# Patient Record
Sex: FEMALE | Race: WHITE | NOT HISPANIC OR LATINO | Employment: STUDENT | URBAN - METROPOLITAN AREA
[De-identification: names, ages, dates, MRNs, and addresses within clinical notes are randomized per-mention and may not be internally consistent; named-entity substitution may affect disease eponyms.]

---

## 2023-04-25 ENCOUNTER — HOSPITAL ENCOUNTER (OUTPATIENT)
Facility: CLINIC | Age: 23
Setting detail: OBSERVATION
Discharge: HOME | End: 2023-04-26
Attending: EMERGENCY MEDICINE | Admitting: SURGERY
Payer: OTHER GOVERNMENT

## 2023-04-25 ENCOUNTER — APPOINTMENT (OUTPATIENT)
Dept: ULTRASOUND IMAGING | Facility: CLINIC | Age: 23
End: 2023-04-25
Attending: EMERGENCY MEDICINE
Payer: OTHER GOVERNMENT

## 2023-04-25 ENCOUNTER — APPOINTMENT (OUTPATIENT)
Dept: CT IMAGING | Facility: CLINIC | Age: 23
End: 2023-04-25
Attending: EMERGENCY MEDICINE
Payer: OTHER GOVERNMENT

## 2023-04-25 ENCOUNTER — HOSPITAL ENCOUNTER (INPATIENT)
Facility: CLINIC | Age: 23
Setting detail: SURGERY ADMIT
End: 2023-04-25
Attending: SURGERY | Admitting: SURGERY
Payer: OTHER GOVERNMENT

## 2023-04-25 DIAGNOSIS — K35.30 ACUTE APPENDICITIS WITH LOCALIZED PERITONITIS, WITHOUT PERFORATION, ABSCESS, OR GANGRENE: Primary | ICD-10-CM

## 2023-04-25 LAB
ALBUMIN SERPL BCG-MCNC: 4.8 G/DL (ref 3.5–5.2)
ALBUMIN UR-MCNC: 200 MG/DL
ALP SERPL-CCNC: 128 U/L (ref 35–104)
ALT SERPL W P-5'-P-CCNC: 25 U/L (ref 10–35)
ANION GAP SERPL CALCULATED.3IONS-SCNC: 15 MMOL/L (ref 7–15)
APPEARANCE UR: ABNORMAL
AST SERPL W P-5'-P-CCNC: 36 U/L (ref 10–35)
BASOPHILS # BLD AUTO: 0 10E3/UL (ref 0–0.2)
BASOPHILS NFR BLD AUTO: 0 %
BILIRUB SERPL-MCNC: 1.5 MG/DL
BILIRUB UR QL STRIP: NEGATIVE
BUN SERPL-MCNC: 10.7 MG/DL (ref 6–20)
CALCIUM SERPL-MCNC: 10.1 MG/DL (ref 8.6–10)
CHLORIDE SERPL-SCNC: 101 MMOL/L (ref 98–107)
COLOR UR AUTO: YELLOW
CREAT SERPL-MCNC: 0.77 MG/DL (ref 0.51–0.95)
DEPRECATED HCO3 PLAS-SCNC: 20 MMOL/L (ref 22–29)
EOSINOPHIL # BLD AUTO: 0 10E3/UL (ref 0–0.7)
EOSINOPHIL NFR BLD AUTO: 0 %
ERYTHROCYTE [DISTWIDTH] IN BLOOD BY AUTOMATED COUNT: 14 % (ref 10–15)
GFR SERPL CREATININE-BSD FRML MDRD: >90 ML/MIN/1.73M2
GLUCOSE SERPL-MCNC: 108 MG/DL (ref 70–99)
GLUCOSE UR STRIP-MCNC: NEGATIVE MG/DL
HCG SERPL QL: NEGATIVE
HCT VFR BLD AUTO: 41.6 % (ref 35–47)
HGB BLD-MCNC: 13.5 G/DL (ref 11.7–15.7)
HGB UR QL STRIP: NEGATIVE
HOLD SPECIMEN: NORMAL
IMM GRANULOCYTES # BLD: 0.1 10E3/UL
IMM GRANULOCYTES NFR BLD: 1 %
KETONES UR STRIP-MCNC: 100 MG/DL
LEUKOCYTE ESTERASE UR QL STRIP: NEGATIVE
LIPASE SERPL-CCNC: 26 U/L (ref 13–60)
LYMPHOCYTES # BLD AUTO: 1 10E3/UL (ref 0.8–5.3)
LYMPHOCYTES NFR BLD AUTO: 7 %
MCH RBC QN AUTO: 29 PG (ref 26.5–33)
MCHC RBC AUTO-ENTMCNC: 32.5 G/DL (ref 31.5–36.5)
MCV RBC AUTO: 90 FL (ref 78–100)
MONOCYTES # BLD AUTO: 0.3 10E3/UL (ref 0–1.3)
MONOCYTES NFR BLD AUTO: 2 %
MUCOUS THREADS #/AREA URNS LPF: PRESENT /LPF
NEUTROPHILS # BLD AUTO: 14.1 10E3/UL (ref 1.6–8.3)
NEUTROPHILS NFR BLD AUTO: 90 %
NITRATE UR QL: NEGATIVE
NRBC # BLD AUTO: 0 10E3/UL
NRBC BLD AUTO-RTO: 0 /100
PH UR STRIP: 8.5 [PH] (ref 5–7)
PLATELET # BLD AUTO: 450 10E3/UL (ref 150–450)
POTASSIUM SERPL-SCNC: 4 MMOL/L (ref 3.4–5.3)
PROT SERPL-MCNC: 8.6 G/DL (ref 6.4–8.3)
RADIOLOGIST FLAGS: ABNORMAL
RBC # BLD AUTO: 4.65 10E6/UL (ref 3.8–5.2)
RBC URINE: 1 /HPF
SODIUM SERPL-SCNC: 136 MMOL/L (ref 136–145)
SP GR UR STRIP: 1.03 (ref 1–1.03)
SQUAMOUS EPITHELIAL: 6 /HPF
UROBILINOGEN UR STRIP-MCNC: NORMAL MG/DL
WBC # BLD AUTO: 15.6 10E3/UL (ref 4–11)
WBC URINE: 1 /HPF

## 2023-04-25 PROCEDURE — 96365 THER/PROPH/DIAG IV INF INIT: CPT | Mod: 59 | Performed by: EMERGENCY MEDICINE

## 2023-04-25 PROCEDURE — 36415 COLL VENOUS BLD VENIPUNCTURE: CPT | Performed by: EMERGENCY MEDICINE

## 2023-04-25 PROCEDURE — 99285 EMERGENCY DEPT VISIT HI MDM: CPT | Performed by: EMERGENCY MEDICINE

## 2023-04-25 PROCEDURE — 96375 TX/PRO/DX INJ NEW DRUG ADDON: CPT | Performed by: EMERGENCY MEDICINE

## 2023-04-25 PROCEDURE — 76705 ECHO EXAM OF ABDOMEN: CPT | Mod: 26 | Performed by: RADIOLOGY

## 2023-04-25 PROCEDURE — 258N000003 HC RX IP 258 OP 636

## 2023-04-25 PROCEDURE — 84703 CHORIONIC GONADOTROPIN ASSAY: CPT | Performed by: EMERGENCY MEDICINE

## 2023-04-25 PROCEDURE — 250N000011 HC RX IP 250 OP 636

## 2023-04-25 PROCEDURE — 96376 TX/PRO/DX INJ SAME DRUG ADON: CPT | Performed by: EMERGENCY MEDICINE

## 2023-04-25 PROCEDURE — 83690 ASSAY OF LIPASE: CPT | Performed by: EMERGENCY MEDICINE

## 2023-04-25 PROCEDURE — 250N000013 HC RX MED GY IP 250 OP 250 PS 637: Performed by: EMERGENCY MEDICINE

## 2023-04-25 PROCEDURE — 96376 TX/PRO/DX INJ SAME DRUG ADON: CPT

## 2023-04-25 PROCEDURE — 81001 URINALYSIS AUTO W/SCOPE: CPT | Performed by: EMERGENCY MEDICINE

## 2023-04-25 PROCEDURE — 250N000011 HC RX IP 250 OP 636: Performed by: EMERGENCY MEDICINE

## 2023-04-25 PROCEDURE — 99285 EMERGENCY DEPT VISIT HI MDM: CPT | Mod: 25 | Performed by: EMERGENCY MEDICINE

## 2023-04-25 PROCEDURE — 74177 CT ABD & PELVIS W/CONTRAST: CPT

## 2023-04-25 PROCEDURE — 76705 ECHO EXAM OF ABDOMEN: CPT

## 2023-04-25 PROCEDURE — 85025 COMPLETE CBC W/AUTO DIFF WBC: CPT | Performed by: EMERGENCY MEDICINE

## 2023-04-25 PROCEDURE — 74177 CT ABD & PELVIS W/CONTRAST: CPT | Mod: 26 | Performed by: STUDENT IN AN ORGANIZED HEALTH CARE EDUCATION/TRAINING PROGRAM

## 2023-04-25 PROCEDURE — 82040 ASSAY OF SERUM ALBUMIN: CPT | Performed by: EMERGENCY MEDICINE

## 2023-04-25 PROCEDURE — G0378 HOSPITAL OBSERVATION PER HR: HCPCS

## 2023-04-25 PROCEDURE — 96361 HYDRATE IV INFUSION ADD-ON: CPT | Performed by: EMERGENCY MEDICINE

## 2023-04-25 PROCEDURE — 258N000003 HC RX IP 258 OP 636: Performed by: EMERGENCY MEDICINE

## 2023-04-25 RX ORDER — HYDROMORPHONE HYDROCHLORIDE 1 MG/ML
0.5 INJECTION, SOLUTION INTRAMUSCULAR; INTRAVENOUS; SUBCUTANEOUS
Status: COMPLETED | OUTPATIENT
Start: 2023-04-25 | End: 2023-04-25

## 2023-04-25 RX ORDER — PROCHLORPERAZINE MALEATE 5 MG
5 TABLET ORAL EVERY 6 HOURS PRN
Status: DISCONTINUED | OUTPATIENT
Start: 2023-04-25 | End: 2023-04-26 | Stop reason: HOSPADM

## 2023-04-25 RX ORDER — POLYETHYLENE GLYCOL 3350 17 G/17G
17 POWDER, FOR SOLUTION ORAL DAILY PRN
Status: DISCONTINUED | OUTPATIENT
Start: 2023-04-25 | End: 2023-04-26 | Stop reason: HOSPADM

## 2023-04-25 RX ORDER — ALBUTEROL SULFATE 90 UG/1
2 AEROSOL, METERED RESPIRATORY (INHALATION) ONCE
Status: COMPLETED | OUTPATIENT
Start: 2023-04-25 | End: 2023-04-25

## 2023-04-25 RX ORDER — PROCHLORPERAZINE 25 MG
25 SUPPOSITORY, RECTAL RECTAL EVERY 12 HOURS PRN
Status: DISCONTINUED | OUTPATIENT
Start: 2023-04-25 | End: 2023-04-26 | Stop reason: HOSPADM

## 2023-04-25 RX ORDER — SODIUM CHLORIDE, SODIUM LACTATE, POTASSIUM CHLORIDE, CALCIUM CHLORIDE 600; 310; 30; 20 MG/100ML; MG/100ML; MG/100ML; MG/100ML
INJECTION, SOLUTION INTRAVENOUS CONTINUOUS
Status: DISCONTINUED | OUTPATIENT
Start: 2023-04-25 | End: 2023-04-26 | Stop reason: HOSPADM

## 2023-04-25 RX ORDER — IOPAMIDOL 755 MG/ML
107 INJECTION, SOLUTION INTRAVASCULAR ONCE
Status: COMPLETED | OUTPATIENT
Start: 2023-04-25 | End: 2023-04-25

## 2023-04-25 RX ORDER — HYDROMORPHONE HCL IN WATER/PF 6 MG/30 ML
0.2 PATIENT CONTROLLED ANALGESIA SYRINGE INTRAVENOUS ONCE
Status: COMPLETED | OUTPATIENT
Start: 2023-04-25 | End: 2023-04-25

## 2023-04-25 RX ORDER — OXYCODONE HYDROCHLORIDE 5 MG/1
5-10 TABLET ORAL EVERY 4 HOURS PRN
Status: DISCONTINUED | OUTPATIENT
Start: 2023-04-25 | End: 2023-04-26 | Stop reason: HOSPADM

## 2023-04-25 RX ORDER — AMOXICILLIN 250 MG
2 CAPSULE ORAL 2 TIMES DAILY PRN
Status: DISCONTINUED | OUTPATIENT
Start: 2023-04-25 | End: 2023-04-26 | Stop reason: HOSPADM

## 2023-04-25 RX ORDER — ACETAMINOPHEN 325 MG/1
975 TABLET ORAL EVERY 8 HOURS
Status: DISCONTINUED | OUTPATIENT
Start: 2023-04-25 | End: 2023-04-26 | Stop reason: HOSPADM

## 2023-04-25 RX ORDER — ERTAPENEM 1 G/1
1 INJECTION, POWDER, LYOPHILIZED, FOR SOLUTION INTRAMUSCULAR; INTRAVENOUS ONCE
Status: COMPLETED | OUTPATIENT
Start: 2023-04-25 | End: 2023-04-25

## 2023-04-25 RX ORDER — LORAZEPAM 0.5 MG/1
0.5 TABLET ORAL EVERY 6 HOURS PRN
COMMUNITY

## 2023-04-25 RX ORDER — TRAZODONE HYDROCHLORIDE 100 MG/1
100 TABLET ORAL AT BEDTIME
COMMUNITY

## 2023-04-25 RX ORDER — ONDANSETRON 4 MG/1
4 TABLET, ORALLY DISINTEGRATING ORAL EVERY 6 HOURS PRN
Status: DISCONTINUED | OUTPATIENT
Start: 2023-04-25 | End: 2023-04-26 | Stop reason: HOSPADM

## 2023-04-25 RX ORDER — ONDANSETRON 2 MG/ML
4 INJECTION INTRAMUSCULAR; INTRAVENOUS EVERY 6 HOURS PRN
Status: DISCONTINUED | OUTPATIENT
Start: 2023-04-25 | End: 2023-04-26 | Stop reason: HOSPADM

## 2023-04-25 RX ORDER — DEXTROAMPHETAMINE SACCHARATE, AMPHETAMINE ASPARTATE, DEXTROAMPHETAMINE SULFATE AND AMPHETAMINE SULFATE 1.25; 1.25; 1.25; 1.25 MG/1; MG/1; MG/1; MG/1
5 TABLET ORAL 3 TIMES DAILY
COMMUNITY

## 2023-04-25 RX ORDER — ERTAPENEM 1 G/1
1 INJECTION, POWDER, LYOPHILIZED, FOR SOLUTION INTRAMUSCULAR; INTRAVENOUS EVERY 24 HOURS
Status: DISCONTINUED | OUTPATIENT
Start: 2023-04-26 | End: 2023-04-26 | Stop reason: HOSPADM

## 2023-04-25 RX ORDER — ONDANSETRON 2 MG/ML
4 INJECTION INTRAMUSCULAR; INTRAVENOUS EVERY 30 MIN PRN
Status: COMPLETED | OUTPATIENT
Start: 2023-04-25 | End: 2023-04-25

## 2023-04-25 RX ORDER — AMOXICILLIN 250 MG
1 CAPSULE ORAL 2 TIMES DAILY PRN
Status: DISCONTINUED | OUTPATIENT
Start: 2023-04-25 | End: 2023-04-26 | Stop reason: HOSPADM

## 2023-04-25 RX ORDER — FLUOXETINE 20 MG/1
60 TABLET, FILM COATED ORAL DAILY
COMMUNITY

## 2023-04-25 RX ORDER — HYDROMORPHONE HCL IN WATER/PF 6 MG/30 ML
.2-.4 PATIENT CONTROLLED ANALGESIA SYRINGE INTRAVENOUS EVERY 4 HOURS PRN
Status: DISCONTINUED | OUTPATIENT
Start: 2023-04-25 | End: 2023-04-26 | Stop reason: HOSPADM

## 2023-04-25 RX ADMIN — HYDROMORPHONE HYDROCHLORIDE 0.2 MG: 0.2 INJECTION, SOLUTION INTRAMUSCULAR; INTRAVENOUS; SUBCUTANEOUS at 20:32

## 2023-04-25 RX ADMIN — HYDROMORPHONE HYDROCHLORIDE 0.2 MG: 0.2 INJECTION, SOLUTION INTRAMUSCULAR; INTRAVENOUS; SUBCUTANEOUS at 21:38

## 2023-04-25 RX ADMIN — ONDANSETRON 4 MG: 2 INJECTION INTRAMUSCULAR; INTRAVENOUS at 13:41

## 2023-04-25 RX ADMIN — SODIUM CHLORIDE, POTASSIUM CHLORIDE, SODIUM LACTATE AND CALCIUM CHLORIDE: 600; 310; 30; 20 INJECTION, SOLUTION INTRAVENOUS at 20:42

## 2023-04-25 RX ADMIN — ONDANSETRON 4 MG: 2 INJECTION INTRAMUSCULAR; INTRAVENOUS at 15:57

## 2023-04-25 RX ADMIN — ERTAPENEM SODIUM 1 G: 1 INJECTION, POWDER, LYOPHILIZED, FOR SOLUTION INTRAMUSCULAR; INTRAVENOUS at 18:28

## 2023-04-25 RX ADMIN — SODIUM CHLORIDE 1000 ML: 9 INJECTION, SOLUTION INTRAVENOUS at 13:40

## 2023-04-25 RX ADMIN — ALBUTEROL SULFATE 2 PUFF: 90 AEROSOL, METERED RESPIRATORY (INHALATION) at 18:28

## 2023-04-25 RX ADMIN — HYDROMORPHONE HYDROCHLORIDE 0.2 MG: 0.2 INJECTION, SOLUTION INTRAMUSCULAR; INTRAVENOUS; SUBCUTANEOUS at 21:33

## 2023-04-25 RX ADMIN — HYDROMORPHONE HYDROCHLORIDE 0.5 MG: 1 INJECTION, SOLUTION INTRAMUSCULAR; INTRAVENOUS; SUBCUTANEOUS at 18:28

## 2023-04-25 RX ADMIN — IOPAMIDOL 107 ML: 755 INJECTION, SOLUTION INTRAVENOUS at 17:12

## 2023-04-25 RX ADMIN — ONDANSETRON 4 MG: 2 INJECTION INTRAMUSCULAR; INTRAVENOUS at 18:27

## 2023-04-25 ASSESSMENT — ACTIVITIES OF DAILY LIVING (ADL)
ADLS_ACUITY_SCORE: 35

## 2023-04-25 NOTE — CONSULTS
General Surgery Consult  2023    Cristian Ashford  : 2000    Date of Service: 2023 6:28 PM    Assessment and Plan:  Cristian Ashford is a 22 year old female with no significant PMH who presents with one day of nausea, vomiting, and abdominal pain. On exam she is afebrile, hemodynamically stable, with tenderness to palpation in RLQ with guarding. Labs significant for leukocytosis of 15.6. UA negative. Her CT scan demonstrates a thickened appendix with periappendiceal streaking, as well as edematous corpus luteal cyst possibly hemorrhagic, with final read mentioning questionable hematoma/ongoing bleed. Initially suspected patient had appendicitis given vomiting, abd pain, and what was initially read as hemorrhagic cyst as patient knew she had this previously, however with final read having concern for bleeding, GYN team consulted.     - Admit to EGS obs  - Gyn consult given final read  - stat hgb recheck   - NPO   - IVF   - received 1 dose ertapenem in ED, will redose if needed tomorrow evening  - patient planned for lap appy tomorrow with Dr. Bajwa, will continue to plan on this for now.   -GYN consult appreciated - resident and staff looked at scan. If patient remains hemodynamically stable and hgb recheck okay, okay to wait for OR til tomorrow am with Dr. Bajwa, with call for intraop consult in am. GYN day team made aware of plan for intraop consult. If Hgb drops, will alert GYN of situation urgently.     Discussed with Dr. Brown, surgery chief resident, and Dr. Lozano, staff, who are in agreement with the above.     Poli Myers MD  Surgery PGY2    History of Present Illness:    Cristian Ashford is a 22 year old female with no significant PMH who presents with one day of nausea, vomiting, and abdominal pain. States it started this am, first kind of all over her abdomen, then localized to the  RLQ. Has vomited about 10 times today, remains nauseous. No appetite. LMP was 3 weeks  ago, No pain with urination. No fevers/chills.  She does not have any allergies.   She has never had surgery before, has had anesthesia with wisdom teeth removal without issue.   She does not smoke cigarettes  Smokes marijuana 3x/week  Does not take blood thinners.     Past Medical History:  History reviewed. No pertinent past medical history.    Past Surgical History  No past surgical history on file.    Family History:  No family history on file.    Social History:  Social History     Socioeconomic History     Marital status: Single     Spouse name: Not on file     Number of children: Not on file     Years of education: Not on file     Highest education level: Not on file   Occupational History     Not on file   Tobacco Use     Smoking status: Not on file     Smokeless tobacco: Not on file   Vaping Use     Vaping status: Not on file   Substance and Sexual Activity     Alcohol use: Not on file     Drug use: Not on file     Sexual activity: Not on file   Other Topics Concern     Not on file   Social History Narrative     Not on file     Social Determinants of Health     Financial Resource Strain: Not on file   Food Insecurity: Not on file   Transportation Needs: Not on file   Physical Activity: Not on file   Stress: Not on file   Social Connections: Not on file   Intimate Partner Violence: Not on file   Housing Stability: Not on file       Medications:  Current Outpatient Medications   Medication Sig Dispense Refill     amphetamine-dextroamphetamine (ADDERALL) 5 MG tablet Take 5 mg by mouth 3 times daily       FLUoxetine 20 MG tablet Take 60 mg by mouth daily       LORazepam (ATIVAN) 0.5 MG tablet Take 0.5 mg by mouth every 6 hours as needed for anxiety       traZODone (DESYREL) 100 MG tablet Take 100 mg by mouth At Bedtime         Allergies:   No Known Allergies    Review of Symptoms:  A 10 point review of symptoms has been conducted and is negative except for that mentioned in the above HPI.       Physical  Exam:    Blood pressure 122/79, pulse 71, temperature 98.2  F (36.8  C), temperature source Oral, weight 79.4 kg (175 lb), last menstrual period 04/04/2023, SpO2 100 %.  Gen:    Lying in bed in NAD, A&OX3  HEENT: Normocephalic and atraumatic  CV:  RRR  Pulm:  Non-labored breathing  Abd:  Soft,tender to palpation RLQ, non-distended  Ext:  Warm and well perfused, no obvious deformities    Labs:  CBC RESULTS:   Recent Labs   Lab Test 04/25/23  1338   WBC 15.6*   RBC 4.65   HGB 13.5   HCT 41.6   MCV 90   MCH 29.0   MCHC 32.5   RDW 14.0        Last Basic Metabolic Panel:  Lab Results   Component Value Date     04/25/2023      Lab Results   Component Value Date    POTASSIUM 4.0 04/25/2023     Lab Results   Component Value Date    CHLORIDE 101 04/25/2023     Lab Results   Component Value Date    THEODORE 10.1 04/25/2023     Lab Results   Component Value Date    CO2 20 04/25/2023     Lab Results   Component Value Date    BUN 10.7 04/25/2023     Lab Results   Component Value Date    CR 0.77 04/25/2023     Lab Results   Component Value Date     04/25/2023       Imaging:  CT Abdomen Pelvis w Contrast    Result Date: 4/25/2023  EXAMINATION: CT ABDOMEN PELVIS W CONTRAST  4/25/2023 5:20 PM  CLINICAL HISTORY: Nausea, vomiting, abdominal pain; concern for acute appendicitis COMPARISON: Abdominal ultrasound 4/25/2023 PROCEDURE COMMENTS: CT of the abdomen was performed with 77 mL of Isovue-370 intravenous contrast. Coronal and sagittal reformatted images were obtained. FINDINGS: Lower thorax: Normal. Abdomen and pelvis: The liver and biliary system, spleen, and pancreas are normal in appearance. The adrenal glands and kidneys are normal in appearance. There are no abnormally dilated or thickened loops of small bowel or colon. There is no free air. The appendix measures 11 mm in diameter (series 3, image 55 and coronal, image 38), with periappendiceal streakiness (series 6, image 38) no surrounding fluid collection.  Small appendicolith is noted within the lumen (series 6, image 38). The right ovary is enlarged measuring approximately 4 x 3.1 cm (series 3, image 66), corpus luteum visualized (series 6, image 14). Superior to the corpus luteal cyst, there is a 1.3 x 0.9 cm area of hypodensity/enhancement (series 3, image 63). Few prominent lymph nodes. Osseous structures: Normal.     IMPRESSION: 1. Thickened appendix with periappendiceal streakiness, concerning for acute appendicitis. No adjacent collection or intra-abdominal free air to suggest  perforation. 2. Edematous bulky right ovary with corpus luteal cyst with adjacent focal hyperdensity/enhancement and mild intermediate density free fluid in the cul-de-sac likely representing hemorrhagic corpus luteal cyst with associated hematoma/ongoing bleed and small hemoperitoneum. [Urgent Result: Appendicitis] Finding was identified on 4/25/2023 5:23 PM. Dr. Crespo was contacted by Dr. Galeano at 4/25/2023 5:43 PM and verbalized understanding of the urgent finding. Finding discussed with Dr. Crespo by Lokesh at 6:30 PM 4/25/2023 I have personally reviewed the examination and initial interpretation and I agree with the findings. CHRISTOPHER OROZCO MD   SYSTEM ID:  [id number]    US Abdomen Limited (RUQ)    Result Date: 4/25/2023  EXAM: Ultrasound abdomen limited 4/25/2023 3:24 PM HISTORY: Pain, vomiting, abnormal LFTs COMPARISON: None available TECHNIQUE: The abdomen was scanned in standard fashion with specialized ultrasound transducer(s) using both grey scale and limited color Doppler techniques. FINDINGS: FLUID: No ascites or pleural effusion. HEPATIC: The liver demonstrates normal echotexture, measuring 17.0 cm in craniocaudal dimension. No focal mass. Main portal vein is patent with antegrade flow towards the liver. GALLBLADDER: No wall thickening, pericholecystic fluid, positive sonographic Hurst's sign, or cholelithiasis. BILIARY DUCTS: Both the intra- and extrahepatic biliary  system are of normal caliber. The common bile duct measures 3.0 mm in diameter. PANCREAS: Visualized portions of the head and body of the pancreas are unremarkable. RENAL: The right kidney measures 10.9 cm in length. No hydronephrosis, calculus, lesion, or mass.     IMPRESSION: Mild hepatomegaly. Otherwise normal right upper quadrant ultrasound. No sonographic correlate to explain the patient's symptoms and LFT elevation. I have personally reviewed the examination and initial interpretation and I agree with the findings. KARI SOLOMON MD   SYSTEM ID:  [id number]

## 2023-04-25 NOTE — ED TRIAGE NOTES
Pt has been vomiting since last night. She thinks she may have eaten something bad. No diarrhea. She has abdominal pain generalized at the top. She says she feels like she is going to pass out. She took a pregnancy test this am that was negative.        right upper arm

## 2023-04-25 NOTE — ED PROVIDER NOTES
[facility] DEPARTMENT ([facility])    4/25/23       ED PROVIDER NOTE   Vertical Triage D   History     Chief Complaint   Patient presents with     Vomiting     The history is provided by the patient.     Cristian Ashford is a 22 year old female presenting to the emergency department with nonbloody, nonbilious vomiting and nausea since this morning.  Patient has no prior medical history other than mental health issues.  No prior abdominal surgeries.  She denies any fevers, diarrhea, however, she cannot remember the last time she had a bowel movement.  She does have some mild, upper, mid abdominal pain with vomiting only.  No cough.  No other concerns or complaints.  She denies possibility of pregnancy.      This part of the document was transcribed by Delilah Brenner, Medical Scribe.    Past Medical History  History reviewed. No pertinent past medical history.  No past surgical history on file.  amphetamine-dextroamphetamine (ADDERALL) 5 MG tablet  FLUoxetine 20 MG tablet  LORazepam (ATIVAN) 0.5 MG tablet  traZODone (DESYREL) 100 MG tablet      No Known Allergies  Family History  No family history on file.  Social History          A medically appropriate review of systems was performed with pertinent positives and negatives noted in the HPI, and all other systems negative.    Physical Exam   BP: 122/79  Pulse: 71  Temp: 98.2  F (36.8  C)  Weight: 79.4 kg (175 lb)  SpO2: 100 %  Physical Exam  Vitals and nursing note reviewed.   Constitutional:       General: She is not in acute distress.     Appearance: She is normal weight. She is not ill-appearing or toxic-appearing.   HENT:      Head: Normocephalic and atraumatic.      Mouth/Throat:      Mouth: Mucous membranes are moist.      Pharynx: Oropharynx is clear.   Eyes:      Extraocular Movements: Extraocular movements intact.      Conjunctiva/sclera: Conjunctivae normal.   Cardiovascular:      Rate and Rhythm: Normal rate.   Pulmonary:       Effort: Pulmonary effort is normal. No respiratory distress.      Breath sounds: No wheezing, rhonchi or rales.   Abdominal:      General: Abdomen is flat. Bowel sounds are normal. There is no distension.      Tenderness: There is abdominal tenderness in the epigastric area. There is no right CVA tenderness, left CVA tenderness, guarding or rebound.      Hernia: No hernia is present.   Musculoskeletal:         General: Normal range of motion.      Cervical back: Normal range of motion and neck supple.   Neurological:      General: No focal deficit present.      Mental Status: She is alert and oriented to person, place, and time.   Psychiatric:         Mood and Affect: Mood normal.         Behavior: Behavior normal.         Thought Content: Thought content normal.         Judgment: Judgment normal.           ED Course, Procedures, & Data      Procedures                     Results for orders placed or performed during the hospital encounter of 04/25/23   US Abdomen Limited (RUQ)     Status: None    Narrative    EXAM: Ultrasound abdomen limited 4/25/2023 3:24 PM     HISTORY: Pain, vomiting, abnormal LFTs     COMPARISON: None available    TECHNIQUE: The abdomen was scanned in standard fashion with  specialized ultrasound transducer(s) using both grey scale and limited  color Doppler techniques.    FINDINGS:     FLUID: No ascites or pleural effusion.    HEPATIC: The liver demonstrates normal echotexture, measuring 17.0 cm  in craniocaudal dimension. No focal mass. Main portal vein is patent  with antegrade flow towards the liver.    GALLBLADDER: No wall thickening, pericholecystic fluid, positive  sonographic Hurst's sign, or cholelithiasis.    BILIARY DUCTS: Both the intra- and extrahepatic biliary system are of  normal caliber. The common bile duct measures 3.0 mm in diameter.    PANCREAS: Visualized portions of the head and body of the pancreas are  unremarkable.    RENAL: The right kidney measures 10.9 cm in  length. No hydronephrosis,  calculus, lesion, or mass.        Impression    IMPRESSION:   Mild hepatomegaly. Otherwise normal right upper quadrant ultrasound.  No sonographic correlate to explain the patient's symptoms and LFT  elevation.      I have personally reviewed the examination and initial interpretation  and I agree with the findings.    KARI SOLOMON MD         SYSTEM ID:  [id number]   CT Abdomen Pelvis w Contrast     Status: Abnormal   Result Value Ref Range    Radiologist flags Appendicitis (Urgent)     Narrative    EXAMINATION: CT ABDOMEN PELVIS W CONTRAST  4/25/2023 5:20 PM      CLINICAL HISTORY: Nausea, vomiting, abdominal pain; concern for acute  appendicitis    COMPARISON: Abdominal ultrasound 4/25/2023    PROCEDURE COMMENTS: CT of the abdomen was performed with 77 mL of  Isovue-370 intravenous contrast. Coronal and sagittal reformatted  images were obtained.    FINDINGS:  Lower thorax:   Normal.    Abdomen and pelvis:  The liver and biliary system, spleen, and pancreas are normal in  appearance. The adrenal glands and kidneys are normal in appearance.    There are no abnormally dilated or thickened loops of small bowel or  colon. There is no free air.     The appendix measures 11 mm in diameter (series 3, image 55 and  coronal, image 38), with periappendiceal streakiness (series 6, image  38) no surrounding fluid collection. Small appendicolith is noted  within the lumen (series 6, image 38).    The right ovary is enlarged measuring approximately 4 x 3.1 cm (series  3, image 66), corpus luteum visualized (series 6, image 14). Superior  to the corpus luteal cyst, there is a 1.3 x 0.9 cm area of  hypodensity/enhancement (series 3, image 63).    Few prominent lymph nodes.    Osseous structures:   Normal.      Impression    IMPRESSION:    1. Thickened appendix with periappendiceal streakiness, concerning for  acute appendicitis. No adjacent collection or intra-abdominal free air  to suggest   perforation.  2. Edematous bulky right ovary with corpus luteal cyst with adjacent  focal hyperdensity/enhancement and mild intermediate density free  fluid in the cul-de-sac likely representing hemorrhagic corpus luteal  cyst with associated hematoma/ongoing bleed and small hemoperitoneum.    [Urgent Result: Appendicitis]    Finding was identified on 4/25/2023 5:23 PM.     Dr. Crespo was contacted by Dr. Galeano at 4/25/2023 5:43 PM and  verbalized understanding of the urgent finding. Finding discussed with  Dr. Crespo by Lokesh at 6:30 PM 4/25/2023     I have personally reviewed the examination and initial interpretation  and I agree with the findings.    CHRISTOPHER OROZCO MD         SYSTEM ID:  [id number]   Comprehensive metabolic panel     Status: Abnormal   Result Value Ref Range    Sodium 136 136 - 145 mmol/L    Potassium 4.0 3.4 - 5.3 mmol/L    Chloride 101 98 - 107 mmol/L    Carbon Dioxide (CO2) 20 (L) 22 - 29 mmol/L    Anion Gap 15 7 - 15 mmol/L    Urea Nitrogen 10.7 6.0 - 20.0 mg/dL    Creatinine 0.77 0.51 - 0.95 mg/dL    Calcium 10.1 (H) 8.6 - 10.0 mg/dL    Glucose 108 (H) 70 - 99 mg/dL    Alkaline Phosphatase 128 (H) 35 - 104 U/L    AST 36 (H) 10 - 35 U/L    ALT 25 10 - 35 U/L    Protein Total 8.6 (H) 6.4 - 8.3 g/dL    Albumin 4.8 3.5 - 5.2 g/dL    Bilirubin Total 1.5 (H) <=1.2 mg/dL    GFR Estimate >90 >60 mL/min/1.73m2   UA with Microscopic reflex to Culture     Status: Abnormal    Specimen: Urine, Midstream   Result Value Ref Range    Color Urine Yellow Colorless, Straw, Light Yellow, Yellow    Appearance Urine Slightly Cloudy (A) Clear    Glucose Urine Negative Negative mg/dL    Bilirubin Urine Negative Negative    Ketones Urine 100 (A) Negative mg/dL    Specific Gravity Urine 1.033 1.003 - 1.035    Blood Urine Negative Negative    pH Urine 8.5 (H) 5.0 - 7.0    Protein Albumin Urine 200 (A) Negative mg/dL    Urobilinogen Urine Normal Normal, 2.0 mg/dL    Nitrite Urine Negative Negative    Leukocyte  Esterase Urine Negative Negative    Mucus Urine Present (A) None Seen /LPF    RBC Urine 1 <=2 /HPF    WBC Urine 1 <=5 /HPF    Squamous Epithelials Urine 6 (H) <=1 /HPF    Narrative    Urine Culture not indicated   HCG qualitative Blood     Status: Normal   Result Value Ref Range    hCG Serum Qualitative Negative Negative   Lipase     Status: Normal   Result Value Ref Range    Lipase 26 13 - 60 U/L   CBC with platelets and differential     Status: Abnormal   Result Value Ref Range    WBC Count 15.6 (H) 4.0 - 11.0 10e3/uL    RBC Count 4.65 3.80 - 5.20 10e6/uL    Hemoglobin 13.5 11.7 - 15.7 g/dL    Hematocrit 41.6 35.0 - 47.0 %    MCV 90 78 - 100 fL    MCH 29.0 26.5 - 33.0 pg    MCHC 32.5 31.5 - 36.5 g/dL    RDW 14.0 10.0 - 15.0 %    Platelet Count 450 150 - 450 10e3/uL    % Neutrophils 90 %    % Lymphocytes 7 %    % Monocytes 2 %    % Eosinophils 0 %    % Basophils 0 %    % Immature Granulocytes 1 %    NRBCs per 100 WBC 0 <1 /100    Absolute Neutrophils 14.1 (H) 1.6 - 8.3 10e3/uL    Absolute Lymphocytes 1.0 0.8 - 5.3 10e3/uL    Absolute Monocytes 0.3 0.0 - 1.3 10e3/uL    Absolute Eosinophils 0.0 0.0 - 0.7 10e3/uL    Absolute Basophils 0.0 0.0 - 0.2 10e3/uL    Absolute Immature Granulocytes 0.1 <=0.4 10e3/uL    Absolute NRBCs 0.0 10e3/uL   [place] Draw     Status: None    Narrative    The following orders were created for panel order [place] Draw.  Procedure                               Abnormality         Status                     ---------                               -----------         ------                     Extra Blue Top Tube[[id number]]                              Final result                 Please view results for these tests on the individual orders.   Extra Blue Top Tube     Status: None   Result Value Ref Range    Hold Specimen JI    CBC with platelets differential     Status: Abnormal    Narrative    The following orders were created for panel order CBC with platelets differential.  Procedure                                Abnormality         Status                     ---------                               -----------         ------                     CBC with platelets and d...[[id number]]  Abnormal            Final result                 Please view results for these tests on the individual orders.     Medications   pharmacy alert - intermittent dosing (has no administration in time range)   ondansetron (ZOFRAN) injection 4 mg (4 mg Intravenous $Given 4/25/23 1827)   0.9% sodium chloride BOLUS (0 mLs Intravenous Stopped 4/25/23 1520)   sodium chloride (PF) 0.9% PF flush 77 mL (77 mLs Intravenous $Given 4/25/23 1712)   iopamidol (ISOVUE-370) solution 107 mL (107 mLs Intravenous $Given 4/25/23 1712)   ertapenem (INVanz) 1 g vial to attach to  mL bag (0 g Intravenous Stopped 4/25/23 1913)   albuterol (PROVENTIL HFA/VENTOLIN HFA) inhaler (2 puffs Inhalation $Given 4/25/23 1828)   HYDROmorphone (PF) (DILAUDID) injection 0.5 mg (0.5 mg Intravenous $Given 4/25/23 1828)     Labs Ordered and Resulted from Time of ED Arrival to Time of ED Departure   COMPREHENSIVE METABOLIC PANEL - Abnormal       Result Value    Sodium 136      Potassium 4.0      Chloride 101      Carbon Dioxide (CO2) 20 (*)     Anion Gap 15      Urea Nitrogen 10.7      Creatinine 0.77      Calcium 10.1 (*)     Glucose 108 (*)     Alkaline Phosphatase 128 (*)     AST 36 (*)     ALT 25      Protein Total 8.6 (*)     Albumin 4.8      Bilirubin Total 1.5 (*)     GFR Estimate >90     ROUTINE UA WITH MICROSCOPIC REFLEX TO CULTURE - Abnormal    Color Urine Yellow      Appearance Urine Slightly Cloudy (*)     Glucose Urine Negative      Bilirubin Urine Negative      Ketones Urine 100 (*)     Specific Gravity Urine 1.033      Blood Urine Negative      pH Urine 8.5 (*)     Protein Albumin Urine 200 (*)     Urobilinogen Urine Normal      Nitrite Urine Negative      Leukocyte Esterase Urine Negative      Mucus Urine Present (*)     RBC Urine 1      WBC  Urine 1      Squamous Epithelials Urine 6 (*)    CBC WITH PLATELETS AND DIFFERENTIAL - Abnormal    WBC Count 15.6 (*)     RBC Count 4.65      Hemoglobin 13.5      Hematocrit 41.6      MCV 90      MCH 29.0      MCHC 32.5      RDW 14.0      Platelet Count 450      % Neutrophils 90      % Lymphocytes 7      % Monocytes 2      % Eosinophils 0      % Basophils 0      % Immature Granulocytes 1      NRBCs per 100 WBC 0      Absolute Neutrophils 14.1 (*)     Absolute Lymphocytes 1.0      Absolute Monocytes 0.3      Absolute Eosinophils 0.0      Absolute Basophils 0.0      Absolute Immature Granulocytes 0.1      Absolute NRBCs 0.0     HCG QUALITATIVE PREGNANCY - Normal    hCG Serum Qualitative Negative     LIPASE - Normal    Lipase 26       CT Abdomen Pelvis w Contrast   Final Result   Abnormal   IMPRESSION:      1. Thickened appendix with periappendiceal streakiness, concerning for   acute appendicitis. No adjacent collection or intra-abdominal free air   to suggest  perforation.   2. Edematous bulky right ovary with corpus luteal cyst with adjacent   focal hyperdensity/enhancement and mild intermediate density free   fluid in the cul-de-sac likely representing hemorrhagic corpus luteal   cyst with associated hematoma/ongoing bleed and small hemoperitoneum.      [Urgent Result: Appendicitis]      Finding was identified on 4/25/2023 5:23 PM.       Dr. Crespo was contacted by Dr. Galeano at 4/25/2023 5:43 PM and   verbalized understanding of the urgent finding. Finding discussed with   Dr. Crespo by Lokesh at 6:30 PM 4/25/2023       I have personally reviewed the examination and initial interpretation   and I agree with the findings.      CHRISTOPHER OROZCO MD            SYSTEM ID:  [id number]      US Abdomen Limited (RUQ)   Final Result   IMPRESSION:    Mild hepatomegaly. Otherwise normal right upper quadrant ultrasound.   No sonographic correlate to explain the patient's symptoms and LFT   elevation.         I have personally  reviewed the examination and initial interpretation   and I agree with the findings.      KARI SOLOMON MD            SYSTEM ID:  [id number]             Critical care was not performed.     Medical Decision Making  The patient's presentation was of high complexity (an acute health issue posing potential threat to life or bodily function).    The patient's evaluation involved:  ordering and/or review of 3+ test(s) in this encounter (see separate area of note for details)  independent interpretation of testing performed by another health professional (see separate area of note for details)  discussion of management or test interpretation with another health professional (see separate area of note for details)    The patient's management necessitated high risk (a parenteral controlled substance) and high risk (a decision regarding hospitalization).      Assessment & Plan    Cristian Ashford is a 22-year-old woman presenting with nausea and vomiting.  Differential diagnosis: Dehydration, electrolyte abnormalities, acute pancreatitis, gastritis, unlikely acute cholecystitis.    After thorough history and physical exam patient appears to be no acute distress.  I will treat her nausea with IV Zofran and hydrate her with a bolus of intravenous normal saline.  I will obtain laboratory studies for further diagnostic evaluation.  Patient agrees with the plan.     Laboratory studies returned with no urinary tract infection.  Pregnancy test is negative.  LFTs were slightly elevated. Ultrasounds obtained.  I reviewed the study along with the radiology report; no evidence of gallstones or acute cholecystitis.  CBD is normal.  Lipase is normal.  There was leukocytosis of 15,600.  On re-examination, patient did have tenderness in the right lower abdomen.  Therefore, CT scan was obtained for further evaluation for possible acute appendicitis.  I reviewed the study along with the radiology report; she does have a acute appendicitis  without evidence of abscess or perforation.  She was given a dose of ertapenem and a dose of IV Dilaudid to manage her symptoms.  General surgery was consulted and she will be admitted to the hospital for further evaluation and management.    7:38 PM  Patient awaiting general surgery consult.  She will be signed out to my partner.    This part of the document was transcribed by Delilah Brenner, Medical Scribe.      I have reviewed the nursing notes. I have reviewed the findings, diagnosis, plan and need for follow up with the patient.    New Prescriptions    No medications on file       Final diagnoses:   Acute appendicitis with localized peritonitis, without perforation, abscess, or gangrene       Kriss Crespo MD   [facility] EMERGENCY DEPARTMENT  4/25/2023     Kriss Crespo MD  04/25/23 1939

## 2023-04-26 ENCOUNTER — ANESTHESIA (OUTPATIENT)
Dept: SURGERY | Facility: CLINIC | Age: 23
End: 2023-04-26
Payer: OTHER GOVERNMENT

## 2023-04-26 ENCOUNTER — ANESTHESIA EVENT (OUTPATIENT)
Dept: SURGERY | Facility: CLINIC | Age: 23
End: 2023-04-26
Payer: OTHER GOVERNMENT

## 2023-04-26 VITALS
RESPIRATION RATE: 11 BRPM | HEART RATE: 70 BPM | WEIGHT: 175 LBS | OXYGEN SATURATION: 99 % | BODY MASS INDEX: 32.2 KG/M2 | HEIGHT: 62 IN | TEMPERATURE: 98 F | DIASTOLIC BLOOD PRESSURE: 65 MMHG | SYSTOLIC BLOOD PRESSURE: 105 MMHG

## 2023-04-26 LAB
ANION GAP SERPL CALCULATED.3IONS-SCNC: 10 MMOL/L (ref 7–15)
BUN SERPL-MCNC: 9.3 MG/DL (ref 6–20)
CALCIUM SERPL-MCNC: 8.8 MG/DL (ref 8.6–10)
CHLORIDE SERPL-SCNC: 105 MMOL/L (ref 98–107)
CREAT SERPL-MCNC: 0.81 MG/DL (ref 0.51–0.95)
DEPRECATED HCO3 PLAS-SCNC: 23 MMOL/L (ref 22–29)
ERYTHROCYTE [DISTWIDTH] IN BLOOD BY AUTOMATED COUNT: 14.5 % (ref 10–15)
GFR SERPL CREATININE-BSD FRML MDRD: >90 ML/MIN/1.73M2
GLUCOSE BLDC GLUCOMTR-MCNC: 77 MG/DL (ref 70–99)
GLUCOSE SERPL-MCNC: 89 MG/DL (ref 70–99)
HCT VFR BLD AUTO: 34.1 % (ref 35–47)
HGB BLD-MCNC: 11.1 G/DL (ref 11.7–15.7)
HGB BLD-MCNC: 11.9 G/DL (ref 11.7–15.7)
MCH RBC QN AUTO: 29.3 PG (ref 26.5–33)
MCHC RBC AUTO-ENTMCNC: 32.6 G/DL (ref 31.5–36.5)
MCV RBC AUTO: 90 FL (ref 78–100)
PLATELET # BLD AUTO: 378 10E3/UL (ref 150–450)
POTASSIUM SERPL-SCNC: 3.8 MMOL/L (ref 3.4–5.3)
RBC # BLD AUTO: 3.79 10E6/UL (ref 3.8–5.2)
SODIUM SERPL-SCNC: 138 MMOL/L (ref 136–145)
WBC # BLD AUTO: 17.6 10E3/UL (ref 4–11)

## 2023-04-26 PROCEDURE — 36415 COLL VENOUS BLD VENIPUNCTURE: CPT

## 2023-04-26 PROCEDURE — 44970 LAPAROSCOPY APPENDECTOMY: CPT | Mod: GC | Performed by: SURGERY

## 2023-04-26 PROCEDURE — 85018 HEMOGLOBIN: CPT | Mod: 91

## 2023-04-26 PROCEDURE — 250N000009 HC RX 250: Performed by: NURSE ANESTHETIST, CERTIFIED REGISTERED

## 2023-04-26 PROCEDURE — 370N000017 HC ANESTHESIA TECHNICAL FEE, PER MIN: Performed by: SURGERY

## 2023-04-26 PROCEDURE — 88304 TISSUE EXAM BY PATHOLOGIST: CPT | Mod: 26 | Performed by: PATHOLOGY

## 2023-04-26 PROCEDURE — 250N000009 HC RX 250: Performed by: SURGERY

## 2023-04-26 PROCEDURE — 710N000010 HC RECOVERY PHASE 1, LEVEL 2, PER MIN: Performed by: SURGERY

## 2023-04-26 PROCEDURE — 710N000012 HC RECOVERY PHASE 2, PER MINUTE: Performed by: SURGERY

## 2023-04-26 PROCEDURE — 250N000011 HC RX IP 250 OP 636

## 2023-04-26 PROCEDURE — 360N000076 HC SURGERY LEVEL 3, PER MIN: Performed by: SURGERY

## 2023-04-26 PROCEDURE — 80048 BASIC METABOLIC PNL TOTAL CA: CPT

## 2023-04-26 PROCEDURE — 258N000003 HC RX IP 258 OP 636

## 2023-04-26 PROCEDURE — 250N000025 HC SEVOFLURANE, PER MIN: Performed by: SURGERY

## 2023-04-26 PROCEDURE — G0378 HOSPITAL OBSERVATION PER HR: HCPCS

## 2023-04-26 PROCEDURE — 999N000141 HC STATISTIC PRE-PROCEDURE NURSING ASSESSMENT: Performed by: SURGERY

## 2023-04-26 PROCEDURE — 272N000001 HC OR GENERAL SUPPLY STERILE: Performed by: SURGERY

## 2023-04-26 PROCEDURE — 99221 1ST HOSP IP/OBS SF/LOW 40: CPT | Mod: GC | Performed by: OBSTETRICS & GYNECOLOGY

## 2023-04-26 PROCEDURE — 250N000013 HC RX MED GY IP 250 OP 250 PS 637

## 2023-04-26 PROCEDURE — 96376 TX/PRO/DX INJ SAME DRUG ADON: CPT

## 2023-04-26 PROCEDURE — 250N000011 HC RX IP 250 OP 636: Performed by: NURSE ANESTHETIST, CERTIFIED REGISTERED

## 2023-04-26 PROCEDURE — 88304 TISSUE EXAM BY PATHOLOGIST: CPT | Mod: TC | Performed by: SURGERY

## 2023-04-26 PROCEDURE — 82962 GLUCOSE BLOOD TEST: CPT

## 2023-04-26 PROCEDURE — 258N000003 HC RX IP 258 OP 636: Performed by: NURSE ANESTHETIST, CERTIFIED REGISTERED

## 2023-04-26 PROCEDURE — 85027 COMPLETE CBC AUTOMATED: CPT

## 2023-04-26 RX ORDER — LIDOCAINE HYDROCHLORIDE 20 MG/ML
INJECTION, SOLUTION INFILTRATION; PERINEURAL PRN
Status: DISCONTINUED | OUTPATIENT
Start: 2023-04-26 | End: 2023-04-26

## 2023-04-26 RX ORDER — AMOXICILLIN 250 MG
1-2 CAPSULE ORAL 2 TIMES DAILY
Qty: 30 TABLET | Refills: 0 | Status: SHIPPED | OUTPATIENT
Start: 2023-04-26

## 2023-04-26 RX ORDER — OXYCODONE HYDROCHLORIDE 5 MG/1
5-10 TABLET ORAL EVERY 4 HOURS PRN
Qty: 6 TABLET | Refills: 0 | Status: SHIPPED | OUTPATIENT
Start: 2023-04-26

## 2023-04-26 RX ORDER — FENTANYL CITRATE 50 UG/ML
50 INJECTION, SOLUTION INTRAMUSCULAR; INTRAVENOUS EVERY 5 MIN PRN
Status: DISCONTINUED | OUTPATIENT
Start: 2023-04-26 | End: 2023-04-26 | Stop reason: HOSPADM

## 2023-04-26 RX ORDER — ONDANSETRON 4 MG/1
4 TABLET, ORALLY DISINTEGRATING ORAL EVERY 30 MIN PRN
Status: DISCONTINUED | OUTPATIENT
Start: 2023-04-26 | End: 2023-04-26 | Stop reason: HOSPADM

## 2023-04-26 RX ORDER — SODIUM CHLORIDE, SODIUM LACTATE, POTASSIUM CHLORIDE, CALCIUM CHLORIDE 600; 310; 30; 20 MG/100ML; MG/100ML; MG/100ML; MG/100ML
INJECTION, SOLUTION INTRAVENOUS CONTINUOUS
Status: DISCONTINUED | OUTPATIENT
Start: 2023-04-26 | End: 2023-04-26 | Stop reason: HOSPADM

## 2023-04-26 RX ORDER — PROPOFOL 10 MG/ML
INJECTION, EMULSION INTRAVENOUS PRN
Status: DISCONTINUED | OUTPATIENT
Start: 2023-04-26 | End: 2023-04-26

## 2023-04-26 RX ORDER — ACETAMINOPHEN 325 MG/1
650 TABLET ORAL
Status: DISCONTINUED | OUTPATIENT
Start: 2023-04-26 | End: 2023-04-26 | Stop reason: HOSPADM

## 2023-04-26 RX ORDER — HYDROMORPHONE HCL IN WATER/PF 6 MG/30 ML
0.2 PATIENT CONTROLLED ANALGESIA SYRINGE INTRAVENOUS EVERY 5 MIN PRN
Status: DISCONTINUED | OUTPATIENT
Start: 2023-04-26 | End: 2023-04-26 | Stop reason: HOSPADM

## 2023-04-26 RX ORDER — DIPHENHYDRAMINE HCL 25 MG
25 CAPSULE ORAL EVERY 6 HOURS PRN
Status: DISCONTINUED | OUTPATIENT
Start: 2023-04-26 | End: 2023-04-26 | Stop reason: HOSPADM

## 2023-04-26 RX ORDER — ACETAMINOPHEN 325 MG/1
650 TABLET ORAL EVERY 4 HOURS PRN
Qty: 50 TABLET | Refills: 0 | Status: SHIPPED | OUTPATIENT
Start: 2023-04-26

## 2023-04-26 RX ORDER — CEFAZOLIN SODIUM 1 G/3ML
INJECTION, POWDER, FOR SOLUTION INTRAMUSCULAR; INTRAVENOUS PRN
Status: DISCONTINUED | OUTPATIENT
Start: 2023-04-26 | End: 2023-04-26

## 2023-04-26 RX ORDER — ONDANSETRON 2 MG/ML
INJECTION INTRAMUSCULAR; INTRAVENOUS PRN
Status: DISCONTINUED | OUTPATIENT
Start: 2023-04-26 | End: 2023-04-26

## 2023-04-26 RX ORDER — ONDANSETRON 2 MG/ML
4 INJECTION INTRAMUSCULAR; INTRAVENOUS EVERY 30 MIN PRN
Status: DISCONTINUED | OUTPATIENT
Start: 2023-04-26 | End: 2023-04-26 | Stop reason: HOSPADM

## 2023-04-26 RX ORDER — HYDROMORPHONE HCL IN WATER/PF 6 MG/30 ML
0.4 PATIENT CONTROLLED ANALGESIA SYRINGE INTRAVENOUS EVERY 5 MIN PRN
Status: DISCONTINUED | OUTPATIENT
Start: 2023-04-26 | End: 2023-04-26 | Stop reason: HOSPADM

## 2023-04-26 RX ORDER — BUPIVACAINE HYDROCHLORIDE 5 MG/ML
INJECTION, SOLUTION PERINEURAL PRN
Status: DISCONTINUED | OUTPATIENT
Start: 2023-04-26 | End: 2023-04-26 | Stop reason: HOSPADM

## 2023-04-26 RX ORDER — KETOROLAC TROMETHAMINE 30 MG/ML
INJECTION, SOLUTION INTRAMUSCULAR; INTRAVENOUS PRN
Status: DISCONTINUED | OUTPATIENT
Start: 2023-04-26 | End: 2023-04-26

## 2023-04-26 RX ORDER — FENTANYL CITRATE 50 UG/ML
25 INJECTION, SOLUTION INTRAMUSCULAR; INTRAVENOUS EVERY 5 MIN PRN
Status: DISCONTINUED | OUTPATIENT
Start: 2023-04-26 | End: 2023-04-26 | Stop reason: HOSPADM

## 2023-04-26 RX ORDER — FENTANYL CITRATE 50 UG/ML
INJECTION, SOLUTION INTRAMUSCULAR; INTRAVENOUS PRN
Status: DISCONTINUED | OUTPATIENT
Start: 2023-04-26 | End: 2023-04-26

## 2023-04-26 RX ORDER — DEXAMETHASONE SODIUM PHOSPHATE 4 MG/ML
INJECTION, SOLUTION INTRA-ARTICULAR; INTRALESIONAL; INTRAMUSCULAR; INTRAVENOUS; SOFT TISSUE PRN
Status: DISCONTINUED | OUTPATIENT
Start: 2023-04-26 | End: 2023-04-26

## 2023-04-26 RX ORDER — DIPHENHYDRAMINE HYDROCHLORIDE 50 MG/ML
25 INJECTION INTRAMUSCULAR; INTRAVENOUS EVERY 6 HOURS PRN
Status: DISCONTINUED | OUTPATIENT
Start: 2023-04-26 | End: 2023-04-26 | Stop reason: HOSPADM

## 2023-04-26 RX ADMIN — FENTANYL CITRATE 50 MCG: 50 INJECTION, SOLUTION INTRAMUSCULAR; INTRAVENOUS at 10:39

## 2023-04-26 RX ADMIN — PROPOFOL 20 MG: 10 INJECTION, EMULSION INTRAVENOUS at 11:15

## 2023-04-26 RX ADMIN — Medication 10 MG: at 10:39

## 2023-04-26 RX ADMIN — Medication 25 MG: at 10:22

## 2023-04-26 RX ADMIN — KETOROLAC TROMETHAMINE 30 MG: 30 INJECTION, SOLUTION INTRAMUSCULAR at 11:06

## 2023-04-26 RX ADMIN — Medication 1 MG: at 00:50

## 2023-04-26 RX ADMIN — ONDANSETRON 4 MG: 2 INJECTION INTRAMUSCULAR; INTRAVENOUS at 11:06

## 2023-04-26 RX ADMIN — MIDAZOLAM 2 MG: 1 INJECTION INTRAMUSCULAR; INTRAVENOUS at 09:58

## 2023-04-26 RX ADMIN — SODIUM CHLORIDE, POTASSIUM CHLORIDE, SODIUM LACTATE AND CALCIUM CHLORIDE: 600; 310; 30; 20 INJECTION, SOLUTION INTRAVENOUS at 08:02

## 2023-04-26 RX ADMIN — CEFAZOLIN 1 G: 1 INJECTION, POWDER, FOR SOLUTION INTRAMUSCULAR; INTRAVENOUS at 10:03

## 2023-04-26 RX ADMIN — PROPOFOL 160 MG: 10 INJECTION, EMULSION INTRAVENOUS at 10:08

## 2023-04-26 RX ADMIN — DEXAMETHASONE SODIUM PHOSPHATE 4 MG: 4 INJECTION, SOLUTION INTRA-ARTICULAR; INTRALESIONAL; INTRAMUSCULAR; INTRAVENOUS; SOFT TISSUE at 10:26

## 2023-04-26 RX ADMIN — HYDROMORPHONE HYDROCHLORIDE 0.2 MG: 0.2 INJECTION, SOLUTION INTRAMUSCULAR; INTRAVENOUS; SUBCUTANEOUS at 00:50

## 2023-04-26 RX ADMIN — LIDOCAINE HYDROCHLORIDE 100 MG: 20 INJECTION, SOLUTION INFILTRATION; PERINEURAL at 10:08

## 2023-04-26 RX ADMIN — Medication 10 MG: at 10:54

## 2023-04-26 RX ADMIN — Medication 5 MG: at 10:08

## 2023-04-26 RX ADMIN — DEXMEDETOMIDINE HYDROCHLORIDE 12 MCG: 100 INJECTION, SOLUTION INTRAVENOUS at 10:47

## 2023-04-26 RX ADMIN — SUGAMMADEX 200 MG: 100 INJECTION, SOLUTION INTRAVENOUS at 11:15

## 2023-04-26 RX ADMIN — FENTANYL CITRATE 100 MCG: 50 INJECTION, SOLUTION INTRAMUSCULAR; INTRAVENOUS at 10:08

## 2023-04-26 RX ADMIN — HYDROMORPHONE HYDROCHLORIDE 0.5 MG: 1 INJECTION, SOLUTION INTRAMUSCULAR; INTRAVENOUS; SUBCUTANEOUS at 10:55

## 2023-04-26 RX ADMIN — SUCCINYLCHOLINE CHLORIDE 120 MG: 20 INJECTION, SOLUTION INTRAMUSCULAR; INTRAVENOUS; PARENTERAL at 10:08

## 2023-04-26 ASSESSMENT — ACTIVITIES OF DAILY LIVING (ADL)
ADLS_ACUITY_SCORE: 35

## 2023-04-26 NOTE — ANESTHESIA POSTPROCEDURE EVALUATION
Patient: Cristian Ashford    Procedure: Procedure(s):  Laparoscopic appendectomy       Anesthesia Type:  General    Note:  Disposition: Outpatient   Postop Pain Control: Uneventful            Sign Out: Well controlled pain   PONV: No   Neuro/Psych: Uneventful            Sign Out: Acceptable/Baseline neuro status   Airway/Respiratory: Uneventful            Sign Out: Acceptable/Baseline resp. status   CV/Hemodynamics: Uneventful            Sign Out: Acceptable CV status; No obvious hypovolemia; No obvious fluid overload   Other NRE: NONE   DID A NON-ROUTINE EVENT OCCUR? No           Last vitals:  Vitals Value Taken Time   /64 04/26/23 1200   Temp 36.9  C (98.4  F) 04/26/23 1200   Pulse 71 04/26/23 1206   Resp 15 04/26/23 1206   SpO2 100 % 04/26/23 1206   Vitals shown include unvalidated device data.    Electronically Signed By: Saulo Rosa MD  April 26, 2023  12:08 PM

## 2023-04-26 NOTE — ANESTHESIA PROCEDURE NOTES
Airway       Patient location during procedure: OR  Staff -        Anesthesiologist:  Saulo Rosa MD       CRNA: Yamile Victoria APRN CRNA       Performed By: CRNA  Consent for Airway        Urgency: elective  Indications and Patient Condition       Indications for airway management: rufino-procedural         Mask difficulty assessment: 0 - not attempted    Final Airway Details       Final airway type: endotracheal airway       Successful airway: ETT - single  Endotracheal Airway Details        ETT size (mm): 7.0       Cuffed: yes       Successful intubation technique: direct laryngoscopy       DL Blade Type: MAC 3       Grade View of Cords: 1       Adjucts: stylet       Position: Right       Measured from: gums/teeth       Secured at (cm): 21       Bite block used: None    Post intubation assessment        Placement verified by: capnometry, equal breath sounds and chest rise        Number of attempts at approach: 1       Secured with: pink tape       Ease of procedure: easy       Dentition: Intact

## 2023-04-26 NOTE — CONSULTS
"Gynecology Consult Note    Consulting Provider: Manjula Lozano MD  Consulting Service: General Surgery  Reason for Consult: Concern for ruptured hemorrhagic cyst    HPI:   Cristian Ashford is a 22 year old G0 with no significant past medical history who presented to the emergency room with nausea, vomiting, and abdominal pain for the last day and was found to have appendicitis. Patient also has known hemorrhagic cyst, and CT scan shows concern for ruptured hemorrhagic cyst with hemoperitoneum. Gynecology was consulted to weigh in on acuity of this finding.    Patient denies lightheadedness, dizziness, palpitations.    ROS:   Negative except as per HPI    OB History:   G0    GYN History:   No hx fibroids, STIs, abnormal pap smears  Hx hemorrhagic cyst    PMH:  MDD, ADHD    PSHx:  No past surgical history on file.    Medications:   Current Outpatient Medications   Medication Instructions     amphetamine-dextroamphetamine (ADDERALL) 5 MG tablet 5 mg, Oral, 3 TIMES DAILY     FLUoxetine 60 mg, Oral, DAILY     LORazepam (ATIVAN) 0.5 mg, Oral, EVERY 6 HOURS PRN     traZODone (DESYREL) 100 mg, Oral, AT BEDTIME       Allergies:    No Known Allergies    Social History: Non-contibutory       Physical Exam:   Patient Vitals for the past 24 hrs:   BP Temp Temp src Pulse SpO2 Height Weight   04/26/23 0050 107/57 98.4  F (36.9  C) Oral 75 100 % -- --   04/25/23 1943 -- -- -- -- -- 1.575 m (5' 2\") --   04/25/23 1315 122/79 -- -- -- -- -- --   04/25/23 1312 -- 98.2  F (36.8  C) Oral 71 100 % -- 79.4 kg (175 lb)     Gen:  Resting comfortably, NAD  CV:  Well perfused  Pulm:  NWOB, CTAB  Abd:  Soft, tender in RLQ, BS+  Ext:  Non-tender, no LE edema b/l  Gyn: Deferred    Labs:  Component      Latest Ref Rng 4/25/2023  1:38 PM   WBC      4.0 - 11.0 10e3/uL 15.6 (H)    RBC Count      3.80 - 5.20 10e6/uL 4.65    Hemoglobin      11.7 - 15.7 g/dL 13.5    Hematocrit      35.0 - 47.0 % 41.6    MCV      78 - 100 fL 90    MCH      26.5 - " 33.0 pg 29.0    MCHC      31.5 - 36.5 g/dL 32.5    RDW      10.0 - 15.0 % 14.0    Platelet Count      150 - 450 10e3/uL 450    % Neutrophils      % 90    % Lymphocytes      % 7    % Monocytes      % 2    % Eosinophils      % 0    % Basophils      % 0    % Immature Granulocytes      % 1      Component      Latest Ref Rng 4/25/2023  1:38 PM   Sodium      136 - 145 mmol/L 136    Potassium      3.4 - 5.3 mmol/L 4.0    Chloride      98 - 107 mmol/L 101    Carbon Dioxide (CO2)      22 - 29 mmol/L 20 (L)    Anion Gap      7 - 15 mmol/L 15    Urea Nitrogen      6.0 - 20.0 mg/dL 10.7    Creatinine      0.51 - 0.95 mg/dL 0.77    Calcium      8.6 - 10.0 mg/dL 10.1 (H)    Glucose      70 - 99 mg/dL 108 (H)    Alkaline Phosphatase      35 - 104 U/L 128 (H)    AST      10 - 35 U/L 36 (H)    ALT      10 - 35 U/L 25    Protein Total      6.4 - 8.3 g/dL 8.6 (H)    Albumin      3.5 - 5.2 g/dL 4.8    Bilirubin Total      <=1.2 mg/dL 1.5 (H)    GFR Estimate      >60 mL/min/1.73m2 >90       Component      Latest Ref Rng 4/25/2023  1:38 PM   HCG Qualitative Serum      Negative  Negative      Imaging  CT A/P (4/25): Impression  1. Thickened appendix with periappendiceal streakiness, concerning for acute appendicitis. No adjacent collection or intra-abdominal free air to suggest perforation.  2. Edematous bulky right ovary with corpus luteal cyst with adjacent focal hyperdensity/enhancement and mild intermediate density free fluid in the cul-de-sac likely representing hemorrhagic corpus luteal cyst with associated hematoma/ongoing bleed and small hemoperitoneum.    A&P:   Cristian Ashford is a 22 year old G0 here with appendicitis. Gynecology was consulted for concern for ruptured hemorrhagic cyst with hemoperitoneum. Vital signs stable, Hgb 13.5 12 hours prior. CT report with small hematoma/hemoperitoneum. Recommend rechecking hemoglobin now and then trending every 6 hours prior to appendectomy surgery. If Hgb levels are dropping  relatively quickly, would consider diagnostic laparoscopy for evaluation of ovarian cyst. If concern for ongoing bleeding from ovarian cyst during appendectomy please place intra-operative consult to benign Gynecology.    - Recheck hemoglobin now and trend every 6 hours  - If Hgb < 10-11 or down-trending rapidly, or if patient clinically worsens, please contact gyn team for possible intervention    Thank you for this consult.  Please do not hesitate to contact us with concerns or questions.       Patient seen and discussed with Dr. Lopez.    Meliza Patten MD  Ob/Gyn Resident, PGY-2  04/26/2023 1:28 AM     I discussed the patient with the resident.  I will be available for intraoperative consult if necessary. I edited and reviewed the above note. I have reviewed all pertinent imaging and labs on this patient.   Lauren Ferrell MD    Department of Ob/Gyn and Women's Health  Division of Gynecologic Oncology  [facility]  [phone]

## 2023-04-26 NOTE — DISCHARGE INSTRUCTIONS
[facility]  Same-Day Surgery   Adult Discharge Orders & Instructions   For 24 hours after surgery    Get plenty of rest.  A responsible adult must stay with you for at least 24 hours after you leave the hospital.   Do not drive or use heavy equipment.  If you have weakness or tingling, don't drive or use heavy equipment until this feeling goes away.  Do not drink alcohol.  Avoid strenuous or risky activities.  Ask for help when climbing stairs.   You may feel lightheaded.  IF so, sit for a few minutes before standing.  Have someone help you get up.   If you have nausea (feel sick to your stomach): Drink only clear liquids such as apple juice, ginger ale, broth or 7-Up.  Rest may also help.  Be sure to drink enough fluids.  Move to a regular diet as you feel able.  You may have a slight fever. Call the doctor if your fever is over 100 F (37.7 C) (taken under the tongue) or lasts longer than 24 hours.  You may have a dry mouth, a sore throat, muscle aches or trouble sleeping.  These should go away after 24 hours.  Do not make important or legal decisions.   Call your doctor for any of the followin.  Signs of infection (fever, growing tenderness at the surgery site, a large amount of drainage or bleeding, severe pain, foul-smelling drainage, redness, swelling).    2. It has been over 8 to 10 hours since surgery and you are still not able to urinate (pass water).    3.  Headache for over 24 hours.    To contact a doctor, call  '   [phone] and ask for the resident on call for General Surgery (answered 24 hours a day)  '   Emergency Department: [facility]: [phone]

## 2023-04-26 NOTE — BRIEF OP NOTE
[facility]    Brief Operative Note    Pre-operative diagnosis: Acute appendicitis [K35.80]  Post-operative diagnosis Same as pre-operative diagnosis    Procedure: Procedure(s):  Laparoscopic appendectomy  Surgeon: Surgeon(s) and Role:     * Dave Bajwa MD - Primary      * Alisa Jennings MD - Assisting     * Anna Marie Sanchez MD - Assisting    Anesthesia: General   Estimated Blood Loss: 5 ml    Drains: None  Specimens:   ID Type Source Tests Collected by Time Destination   1 : appendix Tissue Appendix SURGICAL PATHOLOGY EXAM Dave Bajwa MD 4/26/2023 11:12 AM      Findings:   Mildly inflamed appendix, no perforation. Minimal serosanguinous fluid in pelvis. No purulent or bloody fluid. Bilateral ovaries with cysts, no hemorrhagic component or concern for rupture.   Complications: None.  Implants: * No implants in log *    Alisa Shelton MD  General Surgery PGY-7

## 2023-04-26 NOTE — OP NOTE
[facility]    Operative Note    Pre-operative diagnosis: Acute appendicitis [K35.80]   Post-operative diagnosis Acute, non-perforated, appedicitis   Procedure: Procedure(s):  Laparoscopic appendectomy   Surgeon: Surgeon(s) and Role:     * Dave Bajwa MD - Primary     * Alisa Jennings MD - Resident - Assisting      * Anna Marie Sanchez MD - Resident - Assisting   Anesthesia: General    Estimated blood loss: 5 ml   Drains: None   Specimens: ID Type Source Tests Collected by Time Destination   1 : Appendix Tissue Appendix SURGICAL PATHOLOGY EXAM Dave Bajwa MD 4/26/2023 11:12 AM       Findings: The appendix was mildly inflamed. It was not perforated. There was minimal serosanguinous fluid in the pelvis. No purulent or bloody fluid. Bilateral ovaries with cysts, no hemorrhagic component or concern for rupture.    Complications: None.   Implants: None.     OPERATIVE INDICATIONS:  Cristian Ashford is a 22 year old-year-old female with one-day history of right lower quadrant abdominal pain. WBC 15.6. UA negative.     CT scan of the abdomen and pelvis showed thickened appendix with periappendiceal streaking. There was also evidence of edematous corpus luteal cyst, with question of hemorrhagic component.     After understanding the risks and benefits of proceeding with surgery, the patient has an indication for laparoscopic appendectomy and consented to undergo surgery. We discussed that we would also evaluate the ovaries for potential hemorrhagic cyst.     OPERATIVE DETAILS:  The patient was brought to the operating room and prepared in a routine fashion.  A timeout was performed prior to surgery and documented by the nursing team.    Under the benefits of general anesthesia, the abdomen was entered via open laura technique through a vertical infraumbilical incision. Pneumoperitoneum was established using carbon dioxide gas to a maximum pressure of 15 mmHg.   The 10 mm laparoscope was utilized and confirmed no injuries upon entry. Additional 5 mm suprapubic port and 5 mm LLQ port were placed under direct visualization.    The patient was moved into a position with the right side up.    The cecum was identified and the appendix was identified at the confluence of the tenia coli.    The appendix was elevated. Lysis of adhesions was performed to mobilize the appendix with hook electrocautery.    A window was made at the base of the appendix in the mesoappendix.  A blue load endo-HALI stapler was then used to divide the appendix at its base.     Next, the appendix was elevated and the mesoappendix was divided using a white load endo-HALI stapler.    The appendix was placed into an endocatch bag and removed from the 12mm port.    Hemostasis was confirmed. There was minor bleeding at the mesenteric staple line which was cauterized with the hook cautery.     The ovaries were evaluated and confirmed to have cysts without hemorrhagic component or active bleeding.     The 12mm incision was closed using a single 0 Vicryl suture in figure of eight fashion.     All skin incisions were closed using 4-0 Vicryl suture and steri strips were applied.    Dr. Bajwa was present for all critical components of the operation, and all needle and sponge counts were correct x2 at the end of the procedure.    The patient was extubated and taken to the PACU in stable condition.     Alisa Shelton MD  General Surgery PGY-7

## 2023-04-26 NOTE — ANESTHESIA PREPROCEDURE EVALUATION
Anesthesia Pre-Procedure Evaluation    Patient: Cristian Ashford   MRN: [id number] : 2000        Procedure : Procedure(s):  Laparoscopic appendectomy          History reviewed. No pertinent past medical history.   No past surgical history on file.   No Known Allergies   Social History     Tobacco Use     Smoking status: Not on file     Smokeless tobacco: Not on file   Vaping Use     Vaping status: Not on file   Substance Use Topics     Alcohol use: Not on file      Wt Readings from Last 1 Encounters:   23 79.4 kg (175 lb)        Anesthesia Evaluation   Pt has had prior anesthetic. Type: MAC.        ROS/MED HX  ENT/Pulmonary:  - neg pulmonary ROS     Neurologic:  - neg neurologic ROS     Cardiovascular:  - neg cardiovascular ROS     METS/Exercise Tolerance: >4 METS    Hematologic:  - neg hematologic  ROS     Musculoskeletal:  - neg musculoskeletal ROS     GI/Hepatic:     (+) appendicitis,     Renal/Genitourinary:  - neg Renal ROS     Endo:  - neg endo ROS     Psychiatric/Substance Use:     (+) psychiatric history     Infectious Disease:  - neg infectious disease ROS     Malignancy:  - neg malignancy ROS     Other:  - neg other ROS          Physical Exam    Airway        Mallampati: II   TM distance: > 3 FB   Neck ROM: full   Mouth opening: > 3 cm    Respiratory Devices and Support         Dental       (+) Completely normal teeth      Cardiovascular   cardiovascular exam normal          Pulmonary   pulmonary exam normal                OUTSIDE LABS:  CBC:   Lab Results   Component Value Date    WBC 15.6 (H) 2023    HGB 13.5 2023    HCT 41.6 2023     2023     BMP:   Lab Results   Component Value Date     2023    POTASSIUM 4.0 2023    CHLORIDE 101 2023    CO2 20 (L) 2023    BUN 10.7 2023    CR 0.77 2023     (H) 2023     COAGS: No results found for: PTT, INR, FIBR  POC:   Lab Results   Component Value Date    HCGS  Negative 04/25/2023     HEPATIC:   Lab Results   Component Value Date    ALBUMIN 4.8 04/25/2023    PROTTOTAL 8.6 (H) 04/25/2023    ALT 25 04/25/2023    AST 36 (H) 04/25/2023    ALKPHOS 128 (H) 04/25/2023    BILITOTAL 1.5 (H) 04/25/2023     OTHER:   Lab Results   Component Value Date    THEODORE 10.1 (H) 04/25/2023    LIPASE 26 04/25/2023       Anesthesia Plan    ASA Status:  1   NPO Status:  NPO Appropriate    Anesthesia Type: General.     - Airway: ETT   Induction: Intravenous, RSI.   Maintenance: Inhalation.        Consents    Anesthesia Plan(s) and associated risks, benefits, and realistic alternatives discussed. Questions answered and patient/representative(s) expressed understanding.    - Discussed:     - Discussed with:  Patient      - Extended Intubation/Ventilatory Support Discussed: No.      - Patient is DNR/DNI Status: No    Use of blood products discussed: No .     Postoperative Care    Pain management: IV analgesics, Oral pain medications.   PONV prophylaxis: Ondansetron (or other 5HT-3), Dexamethasone or Solumedrol     Comments:           H&P reviewed: Unable to attach H&P to encounter due to EHR limitations. H&P Update: appropriate H&P reviewed, patient examined. No interval changes since H&P (within 30 days).         Vashti Rothman MD

## 2023-04-26 NOTE — ANESTHESIA CARE TRANSFER NOTE
Patient: Cristian Ashford    Procedure: Procedure(s):  Laparoscopic appendectomy       Diagnosis: Acute appendicitis [K35.80]  Diagnosis Additional Information: No value filed.    Anesthesia Type:   General     Note:    Oropharynx: oropharynx clear of all foreign objects and spontaneously breathing  Level of Consciousness: awake  Oxygen Supplementation: face mask and room air    Independent Airway: airway patency satisfactory and stable  Dentition: dentition unchanged  Vital Signs Stable: post-procedure vital signs reviewed and stable  Report to RN Given: handoff report given  Patient transferred to: PACU    Handoff Report: Identifed the Patient, Identified the Reponsible Provider, Reviewed the pertinent medical history, Discussed the surgical course, Reviewed Intra-OP anesthesia mangement and issues during anesthesia, Set expectations for post-procedure period and Allowed opportunity for questions and acknowledgement of understanding      Vitals:  Vitals Value Taken Time   /70 04/26/23 1129   Temp     Pulse 82 04/26/23 1131   Resp 10 04/26/23 1131   SpO2 100 % 04/26/23 1131   Vitals shown include unvalidated device data.    Electronically Signed By: OLIVIA Guzman CRNA  April 26, 2023  11:31 AM

## 2023-04-27 NOTE — DISCHARGE SUMMARY
[facility] Discharge Summary    Cristian Ashford MRN: [id number]   YOB: 2000 Age: 22 year old     Date of Admission:  4/25/2023  Date of Discharge::  4/26/2023  1:17 PM  Admitting Physician:  Manjula Lozano MD  Discharge Physician:  Dr. Dave Bajwa MD  Primary Care Physician:         No Ref-Primary, Physician          Admission Diagnoses:   Acute appendicitis with localized peritonitis, without perforation, abscess, or gangrene [K35.30]  Hemorrhagic corpus luteal cyst            Discharge Diagnosis:   Same as above  Status post laparoscopic appendectomy         Procedures:   4/26/2023 laparoscopic appendectomy        Non-operative procedures:   None performed          Consultations:   OB GYN IP CONSULT          Brief History of Illness:   Cristian Ashford is a 22 year old female with past medical history of a known corpus luteal cyst who presented to the ED on 4/25/23 with one day of acute onset nausea, vomiting, and abdominal pain. On exam she had focal RLQ abdominal tenderness with guarding, leukocytosis 15.6, and CT with thickened appendix with periappendiceal streaking consistent with acute appendicitis. CT also demonstrated edematous corpus luteal cyst and small-volume hemoperitoneum. Gynecology was consulted for concern of potential hematoma or ongoing bleeding from the cyst, and they diagnosed her with a hemorrhagic ovarian cyst.           Hospital Course:   The patient was admitted to observation and she remained hemodynamically stable with relatively stable hemoglobin on recheck recommended by Gynecology. She underwent laparoscopic cholecystectomy on 4/26/2023, which she tolerated well without complications. During surgery, diagnostic laparoscopy of the pelvis showed no signs of active bleeding or hematoma from the hemorrhagic corpus luteal cyst. She was transferred to the floor for routine postoperative care and the remainder of her hospitalization was  unremarkable.    At the time of discharge, she was tolerating a regular diet, ambulating, voiding spontaneously without difficulty, and pain was controlled with oral pain medications. The patient was discharged home in stable and improved condition. Surgery will follow up with her via telephone call within 1 week.         Final Pathology Result:   Pending at time of discharge         Medications Prior to Admission:     No medications prior to admission.            Discharge Medications:     Discharge Medication List as of 4/26/2023 12:30 PM      START taking these medications    Details   acetaminophen (TYLENOL) 325 MG tablet Take 2 tablets (650 mg) by mouth every 4 hours as needed for mild pain, Disp-50 tablet, R-0, E-Prescribe      oxyCODONE (ROXICODONE) 5 MG tablet Take 1-2 tablets (5-10 mg) by mouth every 4 hours as needed for moderate to severe pain, Disp-6 tablet, R-0, Local Print      senna-docusate (SENOKOT-S/PERICOLACE) 8.6-50 MG tablet Take 1-2 tablets by mouth 2 times daily, Disp-30 tablet, R-0, E-Prescribe         CONTINUE these medications which have NOT CHANGED    Details   amphetamine-dextroamphetamine (ADDERALL) 5 MG tablet Take 5 mg by mouth 3 times daily, Historical      FLUoxetine 20 MG tablet Take 60 mg by mouth daily, Historical      LORazepam (ATIVAN) 0.5 MG tablet Take 0.5 mg by mouth every 6 hours as needed for anxiety, Historical      traZODone (DESYREL) 100 MG tablet Take 100 mg by mouth At Bedtime, Historical                  Day of Discharge Physical Exam:   Temp:  [98  F (36.7  C)-98.2  F (36.8  C)] 98  F (36.7  C)  Pulse:  [70] 70  BP: (105-112)/(63-65) 105/65  SpO2:  [99 %] 99 %  General: Adult female supine in bed, alert and interactive, NAD, lying comfortably in bed.  CV: Regular rate, warm, well-perfused.  Pulm: No dyspnea, breathing comfortably on RA.  Abd: Soft, appropriately tender, mildly distended. No guarding or rebound tenderness. Three laparoscopic port sites with  steristrips c/d/i.  Extremities: No edema, SCDs in place.  Neuro: Moving all extremities spontaneously without apparent deficit.         Discharge Instructions and Follow-Up:        When to call - Contact Surgeon Team    You may experience symptoms that require follow-up before your scheduled appointment. Contact your Surgeon Team if you are concerned about pain control, large amount of bleeding, blood clots, constipation, or if you experience signs of infection (fever, growing tenderness at the surgery site, a large amount of drainage, severe pain, foul-smelling drainage, redness or swelling.    Follow-Up:  - Call if you develop any of the following: fever >101.5, uncontrolled pain, uncontrolled nausea or vomiting, as well as increased redness, swelling, or drainage from your wound.   -  A nurse from the General Surgery Clinic will contact you within 24 hours, or the next business day, after your discharge from the hospital.  If you have questions or to schedule a follow up appointment please call the General Surgery Clinic at [phone].  Call [phone] and ask to speak with the Surgery resident on call if you are having troubles in the evenings, at night, or on the weekend.     No driving or operating machinery    Do NOT drive any vehicle or operate mechanical equipment for 24 hours following the end of your surgery.  Even though you may feel normal, your reactions may be affected by Anesthesia medication you received.     No Alcohol    Do NOT drink alcoholic beverages for 24 hours following your surgery and while taking pain medications.     Diet Instructions    Follow your surgeon's orders for any diet restrictions.  If you did not receive any diet restrictions, you may drink clear liquids (apple juice, ginger ale, 7-up, broth, etc.), and progress to your regular diet as you feel able. It is important to stay well-hydrated after surgery and drink plenty of water.     Discharge Instructions - Comfort and  Pain Management    Pain after surgery is normal and expected. You will have some amount of pain after surgery. Your pain will improve with time. There are several things you can do to help reduce your pain including: rest, ice, and using pain medications as needed. Use pain interventions and don't wait until pain level is out of control. Contact your Surgeon Team if you have pain that persists or worsens after surgery despite rest, ice, and taking your medication(s) as prescribed. You may have a dry mouth, a sore throat, muscles aches or trouble sleeping, and these symptoms should go away after 24 hours.    Medications  - Do not take more than 4,000mg of acetaminophen in any 24 hour period, as this can cause liver damage  - Take stool softeners such as Senna or Miralax while you are using narcotics, but stop if you develop diarrhea  - Wean yourself off of narcotic pain medications as early as able     Shower/Bathing - No restrictions, may shower after 24 hours    Shower/Bathing - No restrictions, may shower after 24 hours.     Incision Care    Incisions  - You may remove wound dressing 24 hours after surgery  - You may shower and get incisions wet starting 24 hrs after surgery  - Do not scrub incisions or submerge wounds (e.g. bath, pool, hot tub, etc.) for 2 weeks or until the glue or steri-strips have come off  - Steri-strips will fall off on their own in approximately 7-10 days     Return to normal activity as tolerated    Return to normal activity as tolerated. No strenuous exercise for 3-4 weeks. If it hurts, don't do it.           Home Health Care:   Not needed              Discharge Disposition:   Discharged to home      Condition at discharge: Stable      Discharge discussed with Dr. Dave Bajwa M.D.    Anna Marie Sanchez M.D.  General Surgery Resident PGY1  [facility]

## 2023-04-28 ENCOUNTER — PATIENT OUTREACH (OUTPATIENT)
Dept: SURGERY | Facility: CLINIC | Age: 23
End: 2023-04-28
Payer: COMMERCIAL

## 2023-04-28 LAB
PATH REPORT.COMMENTS IMP SPEC: NORMAL
PATH REPORT.COMMENTS IMP SPEC: NORMAL
PATH REPORT.FINAL DX SPEC: NORMAL
PATH REPORT.GROSS SPEC: NORMAL
PATH REPORT.MICROSCOPIC SPEC OTHER STN: NORMAL
PATH REPORT.RELEVANT HX SPEC: NORMAL
PHOTO IMAGE: NORMAL

## 2023-04-28 NOTE — PROGRESS NOTES
04/28/23    9:03 AM     Cristian Ashford is a patient of Dr. Alfredito Bajwa that underwent laparoscopic appendectomy approximately 2 days ago (4/26).  Attempted to contact patient via telephone for a status update and review post-op  teaching.  LM on  to call office.  Await return call.      Of note:  Pathology:   Final Diagnosis   A. APPENDIX, APPENDECTOMY:  -Acute suppurative appendicitis     Wound:  Steri-strips  Follow-up:  Routine  Restrictions:  - No lifting, pushing, pulling more than 15-20 pounds for 3-4 weeks  New medications:  Tylenol, oxycodone, Senna  Equipment/Supplies:  None    ADDENDUM  05/01/23  10:24 AM  Attempted to contact patient x 2 for post-op telephone call/status update.  LM on  to call office-contact information provided.

## 2024-10-06 ENCOUNTER — HEALTH MAINTENANCE LETTER (OUTPATIENT)
Age: 24
End: 2024-10-06

## (undated) DEVICE — SOL ADH LIQUID BENZOIN SWAB 0.6ML C1544

## (undated) DEVICE — SOL WATER IRRIG 1000ML BOTTLE 2F7114

## (undated) DEVICE — ENDO POUCH UNIV RETRIEVAL SYSTEM INZII 10MM CD001

## (undated) DEVICE — SUCTION MANIFOLD NEPTUNE 2 SYS 4 PORT 0702-020-000

## (undated) DEVICE — SYR 30ML LL W/O NDL 302832

## (undated) DEVICE — ESU GROUND PAD ADULT W/CORD E7507

## (undated) DEVICE — DRAPE SLEEVE 599

## (undated) DEVICE — STPL RELOAD REG TISSUE ECHELON 45 X 3.6MM BLUE GST45B

## (undated) DEVICE — SU VICRYL 4-0 PS-2 18" UND J496H

## (undated) DEVICE — DRSG STERI STRIP 1/2X4" R1547

## (undated) DEVICE — Device

## (undated) DEVICE — BLADE KNIFE SURG 15 371115

## (undated) DEVICE — LINEN TOWEL PACK X6 WHITE 5487

## (undated) DEVICE — ENDO TROCAR BLUNT TIP KII BALLOON 12X100MM C0R47

## (undated) DEVICE — ADH SKIN CLOSURE PREMIERPRO EXOFIN 1.0ML 3470

## (undated) DEVICE — STPL POWERED ECHELON 45MM PSEE45A

## (undated) DEVICE — NDL BLUNT FILL 18GA 1 1/2" 305064

## (undated) DEVICE — PREP CHLORAPREP 26ML TINTED HI-LITE ORANGE 930815

## (undated) DEVICE — GOWN XLG DISP 9545

## (undated) DEVICE — GLOVE BIOGEL PI ULTRATOUCH SZ 8.0 41180

## (undated) DEVICE — DRSG ADAPTIC 3X3" 6112

## (undated) DEVICE — TUBING SMOKE EVAC PNEUMOCLEAR HIGH FLOW 0620050250

## (undated) DEVICE — DECANTER VIAL 2006S

## (undated) DEVICE — LINEN TOWEL PACK X5 5464

## (undated) RX ORDER — HYDROMORPHONE HYDROCHLORIDE 1 MG/ML
INJECTION, SOLUTION INTRAMUSCULAR; INTRAVENOUS; SUBCUTANEOUS
Status: DISPENSED
Start: 2023-04-26

## (undated) RX ORDER — ONDANSETRON 2 MG/ML
INJECTION INTRAMUSCULAR; INTRAVENOUS
Status: DISPENSED
Start: 2023-04-26

## (undated) RX ORDER — FENTANYL CITRATE 50 UG/ML
INJECTION, SOLUTION INTRAMUSCULAR; INTRAVENOUS
Status: DISPENSED
Start: 2023-04-26

## (undated) RX ORDER — CEFAZOLIN SODIUM 1 G/3ML
INJECTION, POWDER, FOR SOLUTION INTRAMUSCULAR; INTRAVENOUS
Status: DISPENSED
Start: 2023-04-26

## (undated) RX ORDER — PROPOFOL 10 MG/ML
INJECTION, EMULSION INTRAVENOUS
Status: DISPENSED
Start: 2023-04-26

## (undated) RX ORDER — BUPIVACAINE HYDROCHLORIDE 5 MG/ML
INJECTION, SOLUTION EPIDURAL; INTRACAUDAL
Status: DISPENSED
Start: 2023-04-26

## (undated) RX ORDER — LIDOCAINE HYDROCHLORIDE 10 MG/ML
INJECTION, SOLUTION EPIDURAL; INFILTRATION; INTRACAUDAL; PERINEURAL
Status: DISPENSED
Start: 2023-04-26